# Patient Record
Sex: FEMALE | Race: OTHER | URBAN - METROPOLITAN AREA
[De-identification: names, ages, dates, MRNs, and addresses within clinical notes are randomized per-mention and may not be internally consistent; named-entity substitution may affect disease eponyms.]

---

## 2022-06-17 ENCOUNTER — NEW PATIENT (OUTPATIENT)
Dept: URBAN - METROPOLITAN AREA CLINIC 50 | Facility: CLINIC | Age: 72
End: 2022-06-17

## 2022-06-17 DIAGNOSIS — H26.493: ICD-10-CM

## 2022-06-17 DIAGNOSIS — H40.1134: ICD-10-CM

## 2022-06-17 PROCEDURE — 92004 COMPRE OPH EXAM NEW PT 1/>: CPT

## 2022-06-17 PROCEDURE — 76514 ECHO EXAM OF EYE THICKNESS: CPT

## 2022-06-17 ASSESSMENT — KERATOMETRY
OD_K1POWER_DIOPTERS: 49.25
OD_AXISANGLE2_DEGREES: 41
OD_K2POWER_DIOPTERS: 50.50
OS_AXISANGLE_DEGREES: 66
OS_K1POWER_DIOPTERS: 48.75
OD_AXISANGLE_DEGREES: 131
OS_AXISANGLE2_DEGREES: 156
OS_K2POWER_DIOPTERS: 50.00

## 2022-06-17 ASSESSMENT — VISUAL ACUITY
OU_CC: 20/20-2
OD_GLARE: 20/25
OD_PH: 20/30-1
OU_SC: 20/50
OD_CC: 20/40+2
OU_CC: J1+@14IN
OS_GLARE: 20/20
OS_CC: 20/25+2
OS_GLARE: 20/20
OD_GLARE: 20/30
OD_SC: 20/60
OS_SC: 20/60

## 2022-06-17 ASSESSMENT — TONOMETRY
OS_IOP_MMHG: 18
OD_IOP_MMHG: 15
OD_IOP_MMHG: 19
OS_IOP_MMHG: 15

## 2022-06-17 ASSESSMENT — PACHYMETRY
OS_CT_UM: 504
OD_CT_UM: 500

## 2022-12-22 NOTE — PATIENT DISCUSSION
discussed with patient that she is noticing the healing process. No detachment or tear seen on dilated exam today .

## 2022-12-30 NOTE — PATIENT DISCUSSION
Still has corneal edema and AC rxn - increase pred back to QID with weekly taper, new drop calendar given.

## 2023-01-20 ENCOUNTER — FOLLOW UP (OUTPATIENT)
Dept: URBAN - METROPOLITAN AREA CLINIC 50 | Facility: CLINIC | Age: 73
End: 2023-01-20

## 2023-01-20 DIAGNOSIS — H40.1134: ICD-10-CM

## 2023-01-20 PROCEDURE — 92012 INTRM OPH EXAM EST PATIENT: CPT

## 2023-01-20 ASSESSMENT — KERATOMETRY
OD_K2POWER_DIOPTERS: 50.50
OS_K1POWER_DIOPTERS: 48.75
OD_AXISANGLE2_DEGREES: 41
OS_AXISANGLE_DEGREES: 66
OD_K1POWER_DIOPTERS: 49.25
OS_AXISANGLE2_DEGREES: 156
OD_AXISANGLE_DEGREES: 131
OS_K2POWER_DIOPTERS: 50.00

## 2023-01-20 ASSESSMENT — VISUAL ACUITY
OD_PH: 20/40
OD_CC: 20/50
OU_CC: 20/25
OS_CC: 20/25-2

## 2023-01-20 ASSESSMENT — TONOMETRY
OS_IOP_MMHG: 19
OD_IOP_MMHG: 20
OD_IOP_MMHG: 16
OS_IOP_MMHG: 16

## 2023-07-21 ENCOUNTER — COMPREHENSIVE EXAM (OUTPATIENT)
Dept: URBAN - METROPOLITAN AREA CLINIC 50 | Facility: CLINIC | Age: 73
End: 2023-07-21

## 2023-07-21 DIAGNOSIS — H35.363: ICD-10-CM

## 2023-07-21 DIAGNOSIS — H35.54: ICD-10-CM

## 2023-07-21 DIAGNOSIS — H40.1134: ICD-10-CM

## 2023-07-21 DIAGNOSIS — H26.493: ICD-10-CM

## 2023-07-21 PROCEDURE — 66821 AFTER CATARACT LASER SURGERY: CPT

## 2023-07-21 PROCEDURE — 92014 COMPRE OPH EXAM EST PT 1/>: CPT

## 2023-07-21 PROCEDURE — 92015 DETERMINE REFRACTIVE STATE: CPT

## 2023-07-21 ASSESSMENT — KERATOMETRY
OS_AXISANGLE2_DEGREES: 156
OS_AXISANGLE_DEGREES: 66
OD_AXISANGLE_DEGREES: 131
OD_K2POWER_DIOPTERS: 50.50
OD_K1POWER_DIOPTERS: 49.25
OS_K2POWER_DIOPTERS: 50.00
OS_K1POWER_DIOPTERS: 48.75
OD_AXISANGLE2_DEGREES: 41

## 2023-07-21 ASSESSMENT — VISUAL ACUITY
OD_GLARE: 20/30
OU_CC: 20/20-2
OD_GLARE: 20/50
OS_GLARE: 20/30
OS_CC: 20/30
OU_CC: J1+@16"
OS_PH: 20/25
OD_CC: 20/25-2
OS_GLARE: 20/50

## 2023-07-21 ASSESSMENT — TONOMETRY
OD_IOP_MMHG: 17
OD_IOP_MMHG: 13
OS_IOP_MMHG: 16
OS_IOP_MMHG: 13
OD_IOP_MMHG: 18
OD_IOP_MMHG: 14

## 2023-09-01 ENCOUNTER — POST-OP (OUTPATIENT)
Dept: URBAN - METROPOLITAN AREA CLINIC 50 | Facility: LOCATION | Age: 73
End: 2023-09-01

## 2023-09-01 DIAGNOSIS — H26.492: ICD-10-CM

## 2023-09-01 PROCEDURE — 66821 AFTER CATARACT LASER SURGERY: CPT

## 2023-09-01 ASSESSMENT — KERATOMETRY
OS_AXISANGLE_DEGREES: 66
OD_K1POWER_DIOPTERS: 49.25
OD_AXISANGLE2_DEGREES: 41
OS_K2POWER_DIOPTERS: 50.00
OD_K2POWER_DIOPTERS: 50.50
OD_AXISANGLE_DEGREES: 131
OS_AXISANGLE2_DEGREES: 156
OS_K1POWER_DIOPTERS: 48.75

## 2023-09-01 ASSESSMENT — VISUAL ACUITY
OS_GLARE: 20/30
OS_CC: 20/20-1
OD_CC: 20/25
OS_GLARE: 20/40

## 2023-09-01 ASSESSMENT — TONOMETRY
OS_IOP_MMHG: 14
OS_IOP_MMHG: 12
OD_IOP_MMHG: 12

## 2023-09-29 ENCOUNTER — POST-OP (OUTPATIENT)
Dept: URBAN - METROPOLITAN AREA CLINIC 50 | Facility: LOCATION | Age: 73
End: 2023-09-29

## 2023-09-29 DIAGNOSIS — Z98.890: ICD-10-CM

## 2023-09-29 PROCEDURE — 99024 POSTOP FOLLOW-UP VISIT: CPT

## 2023-09-29 PROCEDURE — 92015 DETERMINE REFRACTIVE STATE: CPT

## 2023-09-29 RX ORDER — DORZOLAMIDE HYDROCHLORIDE TIMOLOL MALEATE 20; 5 MG/ML; MG/ML
1 SOLUTION/ DROPS OPHTHALMIC TWICE A DAY
Start: 2023-09-29

## 2023-09-29 RX ORDER — BRIMONIDINE TARTRATE 2 MG/MG
1 SOLUTION/ DROPS OPHTHALMIC TWICE A DAY
Start: 2023-09-29

## 2023-09-29 ASSESSMENT — KERATOMETRY
OS_K1POWER_DIOPTERS: 48.75
OS_K2POWER_DIOPTERS: 50.00
OS_AXISANGLE2_DEGREES: 156
OD_K1POWER_DIOPTERS: 49.25
OS_AXISANGLE_DEGREES: 66
OD_AXISANGLE2_DEGREES: 41
OD_K2POWER_DIOPTERS: 50.50
OD_AXISANGLE_DEGREES: 131

## 2023-09-29 ASSESSMENT — VISUAL ACUITY
OS_CC: 20/20
OU_CC: J1+
OU_CC: 20/20
OD_CC: 20/20

## 2023-09-29 ASSESSMENT — TONOMETRY
OD_IOP_MMHG: 19
OD_IOP_MMHG: 15
OS_IOP_MMHG: 15
OS_IOP_MMHG: 18

## 2023-12-15 ENCOUNTER — FOLLOW UP (OUTPATIENT)
Dept: URBAN - METROPOLITAN AREA CLINIC 50 | Facility: LOCATION | Age: 73
End: 2023-12-15

## 2023-12-15 DIAGNOSIS — H40.1133: ICD-10-CM

## 2023-12-15 PROCEDURE — 65855 TRABECULOPLASTY LASER SURG: CPT

## 2023-12-15 PROCEDURE — 92020 GONIOSCOPY: CPT

## 2023-12-15 PROCEDURE — 99213 OFFICE O/P EST LOW 20 MIN: CPT

## 2023-12-15 ASSESSMENT — TONOMETRY
OS_IOP_MMHG: 14
OS_IOP_MMHG: 12
OS_IOP_MMHG: 15
OD_IOP_MMHG: 13
OS_IOP_MMHG: 17
OD_IOP_MMHG: 17

## 2023-12-15 ASSESSMENT — VISUAL ACUITY
OS_CC: 20/25
OD_CC: 20/20-2
OU_CC: 20/20-1

## 2024-01-12 ENCOUNTER — CLINIC PROCEDURE ONLY (OUTPATIENT)
Dept: URBAN - METROPOLITAN AREA CLINIC 50 | Facility: LOCATION | Age: 74
End: 2024-01-12

## 2024-01-12 DIAGNOSIS — H40.1133: ICD-10-CM

## 2024-01-12 PROCEDURE — 65855 TRABECULOPLASTY LASER SURG: CPT

## 2024-01-12 ASSESSMENT — VISUAL ACUITY
OS_CC: 20/25
OD_CC: 20/25

## 2024-01-12 ASSESSMENT — TONOMETRY
OS_IOP_MMHG: 11
OD_IOP_MMHG: 12
OD_IOP_MMHG: 15
OD_IOP_MMHG: 16
OD_IOP_MMHG: 19
OS_IOP_MMHG: 14

## 2024-03-15 ENCOUNTER — FOLLOW UP (OUTPATIENT)
Dept: URBAN - METROPOLITAN AREA CLINIC 50 | Facility: LOCATION | Age: 74
End: 2024-03-15

## 2024-03-15 DIAGNOSIS — H40.1133: ICD-10-CM

## 2024-03-15 PROCEDURE — 99213 OFFICE O/P EST LOW 20 MIN: CPT

## 2024-03-15 PROCEDURE — 92083 EXTENDED VISUAL FIELD XM: CPT

## 2024-03-15 PROCEDURE — 92133 CPTRZD OPH DX IMG PST SGM ON: CPT

## 2024-03-15 ASSESSMENT — VISUAL ACUITY
OS_CC: 20/20-1
OU_CC: 20/20
OD_CC: 20/20-2

## 2024-03-15 ASSESSMENT — TONOMETRY
OS_IOP_MMHG: 14
OD_IOP_MMHG: 12
OS_IOP_MMHG: 11
OD_IOP_MMHG: 16

## 2024-06-14 ENCOUNTER — FOLLOW UP (OUTPATIENT)
Dept: URBAN - METROPOLITAN AREA CLINIC 50 | Facility: LOCATION | Age: 74
End: 2024-06-14

## 2024-06-14 DIAGNOSIS — H04.123: ICD-10-CM

## 2024-06-14 DIAGNOSIS — H40.1133: ICD-10-CM

## 2024-06-14 PROCEDURE — 99213 OFFICE O/P EST LOW 20 MIN: CPT

## 2024-06-14 RX ORDER — CARBOXYMETHYLCELLULOSE SODIUM 10 MG/ML: 1 GEL OPHTHALMIC EVERY EVENING

## 2024-06-14 RX ORDER — GLYCERIN 2 G/1000ML: 1 SOLUTION/ DROPS OPHTHALMIC

## 2024-06-14 ASSESSMENT — TONOMETRY
OS_IOP_MMHG: 14
OD_IOP_MMHG: 09
OD_IOP_MMHG: 13
OS_IOP_MMHG: 11

## 2024-06-14 ASSESSMENT — VISUAL ACUITY
OD_CC: 20/20
OS_CC: 20/20-2

## 2024-09-13 ENCOUNTER — COMPREHENSIVE EXAM (OUTPATIENT)
Dept: URBAN - METROPOLITAN AREA CLINIC 50 | Facility: LOCATION | Age: 74
End: 2024-09-13

## 2024-09-13 DIAGNOSIS — H35.54: ICD-10-CM

## 2024-09-13 DIAGNOSIS — H35.363: ICD-10-CM

## 2024-09-13 DIAGNOSIS — H40.1133: ICD-10-CM

## 2024-09-13 DIAGNOSIS — H04.123: ICD-10-CM

## 2024-09-13 PROCEDURE — 99214 OFFICE O/P EST MOD 30 MIN: CPT

## 2025-03-14 ENCOUNTER — FOLLOW UP (OUTPATIENT)
Age: 75
End: 2025-03-14

## 2025-03-14 DIAGNOSIS — H35.54: ICD-10-CM

## 2025-03-14 DIAGNOSIS — H40.1133: ICD-10-CM

## 2025-03-14 PROCEDURE — 92083 EXTENDED VISUAL FIELD XM: CPT

## 2025-03-14 PROCEDURE — 92133 CPTRZD OPH DX IMG PST SGM ON: CPT

## 2025-03-14 PROCEDURE — 99213 OFFICE O/P EST LOW 20 MIN: CPT
